# Patient Record
Sex: FEMALE
[De-identification: names, ages, dates, MRNs, and addresses within clinical notes are randomized per-mention and may not be internally consistent; named-entity substitution may affect disease eponyms.]

---

## 2019-02-19 ENCOUNTER — HOSPITAL ENCOUNTER (EMERGENCY)
Dept: HOSPITAL 14 - H.ER | Age: 64
Discharge: HOME | End: 2019-02-19
Payer: MEDICARE

## 2019-02-19 VITALS
OXYGEN SATURATION: 99 % | SYSTOLIC BLOOD PRESSURE: 146 MMHG | RESPIRATION RATE: 18 BRPM | DIASTOLIC BLOOD PRESSURE: 82 MMHG | HEART RATE: 101 BPM | TEMPERATURE: 98.3 F

## 2019-02-19 DIAGNOSIS — Z76.0: Primary | ICD-10-CM

## 2019-02-19 DIAGNOSIS — F20.9: ICD-10-CM

## 2019-02-19 DIAGNOSIS — G47.00: ICD-10-CM

## 2019-02-19 DIAGNOSIS — Z79.899: ICD-10-CM

## 2019-02-19 NOTE — ED PDOC
HPI: Psych/Substance Abuse


Time Seen by Provider: 02/19/19 17:44


Chief Complaint (Nursing): Psychiatric Evaluation


Chief Complaint (Provider): med refill


History Per: Patient (62 y/o female h/o Schizophrenia and insomnia requesting 

change in medication.  STates she was recently taken of thioxetene as it was 

discontinued and written alternative medication.  Feels that this medicine does 

not agree with her and causes her headaches.  Attempted to seek attention from 

her psychiatrist but was unable as he was admitted in hospital.)





Past Medical History


Reviewed: Historical Data, Nursing Documentation, Vital Signs


Vital Signs: 





                                Last Vital Signs











Temp  98.3 F   02/19/19 16:38


 


Pulse  101 H  02/19/19 16:38


 


Resp  18   02/19/19 16:38


 


BP  146/82   02/19/19 16:38


 


Pulse Ox  99   02/19/19 16:38














- Family History


Family History: States: No Known Family Hx





- Home Medications


Home Medications: 


                                Ambulatory Orders











 Medication  Instructions  Recorded


 


Clonazepam [Klonopin] 0.5 mg PO BID 02/19/19


 


PARoxetine [Paxil] 20 mg PO DAILY 02/19/19


 


Thiothixene [Navane] 10 mg PO DAILY #7 cap 02/19/19


 


Zolpidem [Ambien] 10 mg PO HS 02/19/19


 


chlorproMAZINE [chlorPROMAZINE HCL] 50 mg PO DAILY 02/19/19














- Allergies


Allergies/Adverse Reactions: 


                                    Allergies











Allergy/AdvReac Type Severity Reaction Status Date / Time


 


quetiapine [From Seroquel] Allergy  RASH Verified 02/19/19 16:38














Review of Systems


ROS Statement: Except As Marked, All Systems Reviewed And Found Negative





Physical Exam





- Reviewed


Nursing Documentation Reviewed: Yes


Vital Signs Reviewed: Yes





- Physical Exam


Appears: Positive for: Well, Non-toxic, No Acute Distress


Head Exam: Positive for: ATRAUMATIC, NORMAL INSPECTION, NORMOCEPHALIC


Skin: Positive for: Normal Color, Warm, DRY


Eye Exam: Positive for: EOMI, Normal appearance, PERRL


ENT: Positive for: Normal ENT Inspection


Neck: Positive for: Normal, Painless ROM


Cardiovascular/Chest: Positive for: Regular Rate, Rhythm


Respiratory: Positive for: CNT, Normal Breath Sounds


Gastrointestinal/Abdominal: Positive for: Normal Exam, Soft


Back: Positive for: Normal Inspection


Extremity: Positive for: Normal ROM


Neurologic/Psych: Positive for: Alert, Oriented





- ECG


O2 Sat by Pulse Oximetry: 99





- Progress


ED Course And Treament: 





SEEN BY CRISIS





D/W DR. CHAUDHRY.





DR. CHAUDHRY REQUESTS RX FOR NAVANE 10MG DAILY X 7 DAYS TO BE GIVEN TO PATIENT.





Disposition





- Clinical Impression


Clinical Impression: 


 Medication refill








- Patient ED Disposition


Is Patient to be Admitted: No





- Disposition


Disposition: Routine/Home


Disposition Time: 19:44


Condition: FAIR


Prescriptions: 


Thiothixene [Navane] 10 mg PO DAILY #7 cap


Instructions:  Schizophrenia, Insomnia (DC)